# Patient Record
Sex: MALE | Race: WHITE | NOT HISPANIC OR LATINO | URBAN - METROPOLITAN AREA
[De-identification: names, ages, dates, MRNs, and addresses within clinical notes are randomized per-mention and may not be internally consistent; named-entity substitution may affect disease eponyms.]

---

## 2019-10-09 ENCOUNTER — EMERGENCY (EMERGENCY)
Facility: HOSPITAL | Age: 49
LOS: 1 days | Discharge: HOME | End: 2019-10-09
Attending: EMERGENCY MEDICINE | Admitting: EMERGENCY MEDICINE
Payer: COMMERCIAL

## 2019-10-09 VITALS
WEIGHT: 210.1 LBS | DIASTOLIC BLOOD PRESSURE: 82 MMHG | OXYGEN SATURATION: 97 % | SYSTOLIC BLOOD PRESSURE: 126 MMHG | HEIGHT: 72 IN | HEART RATE: 61 BPM | RESPIRATION RATE: 16 BRPM | TEMPERATURE: 98 F

## 2019-10-09 VITALS
HEART RATE: 62 BPM | DIASTOLIC BLOOD PRESSURE: 67 MMHG | RESPIRATION RATE: 16 BRPM | TEMPERATURE: 98 F | SYSTOLIC BLOOD PRESSURE: 105 MMHG | OXYGEN SATURATION: 96 %

## 2019-10-09 DIAGNOSIS — M25.561 PAIN IN RIGHT KNEE: Chronic | ICD-10-CM

## 2019-10-09 LAB
ALBUMIN SERPL ELPH-MCNC: 4.1 G/DL — SIGNIFICANT CHANGE UP (ref 3.3–5)
ALP SERPL-CCNC: 68 U/L — SIGNIFICANT CHANGE UP (ref 40–120)
ALT FLD-CCNC: 28 U/L — SIGNIFICANT CHANGE UP (ref 12–78)
ANION GAP SERPL CALC-SCNC: 6 MMOL/L — SIGNIFICANT CHANGE UP (ref 5–17)
APTT BLD: 33.2 SEC — SIGNIFICANT CHANGE UP (ref 28.5–37)
AST SERPL-CCNC: 24 U/L — SIGNIFICANT CHANGE UP (ref 15–37)
BASOPHILS # BLD AUTO: 0.04 K/UL — SIGNIFICANT CHANGE UP (ref 0–0.2)
BASOPHILS NFR BLD AUTO: 0.5 % — SIGNIFICANT CHANGE UP (ref 0–2)
BILIRUB SERPL-MCNC: 1.2 MG/DL — SIGNIFICANT CHANGE UP (ref 0.2–1.2)
BUN SERPL-MCNC: 14 MG/DL — SIGNIFICANT CHANGE UP (ref 7–23)
CALCIUM SERPL-MCNC: 9 MG/DL — SIGNIFICANT CHANGE UP (ref 8.5–10.1)
CHLORIDE SERPL-SCNC: 107 MMOL/L — SIGNIFICANT CHANGE UP (ref 96–108)
CO2 SERPL-SCNC: 27 MMOL/L — SIGNIFICANT CHANGE UP (ref 22–31)
CREAT SERPL-MCNC: 0.92 MG/DL — SIGNIFICANT CHANGE UP (ref 0.5–1.3)
EOSINOPHIL # BLD AUTO: 0.07 K/UL — SIGNIFICANT CHANGE UP (ref 0–0.5)
EOSINOPHIL NFR BLD AUTO: 0.9 % — SIGNIFICANT CHANGE UP (ref 0–6)
GLUCOSE SERPL-MCNC: 110 MG/DL — HIGH (ref 70–99)
HCT VFR BLD CALC: 43.4 % — SIGNIFICANT CHANGE UP (ref 39–50)
HGB BLD-MCNC: 15.5 G/DL — SIGNIFICANT CHANGE UP (ref 13–17)
IMM GRANULOCYTES NFR BLD AUTO: 0.2 % — SIGNIFICANT CHANGE UP (ref 0–1.5)
INR BLD: 1.1 RATIO — SIGNIFICANT CHANGE UP (ref 0.88–1.16)
LYMPHOCYTES # BLD AUTO: 1.45 K/UL — SIGNIFICANT CHANGE UP (ref 1–3.3)
LYMPHOCYTES # BLD AUTO: 17.7 % — SIGNIFICANT CHANGE UP (ref 13–44)
MCHC RBC-ENTMCNC: 30.5 PG — SIGNIFICANT CHANGE UP (ref 27–34)
MCHC RBC-ENTMCNC: 35.7 GM/DL — SIGNIFICANT CHANGE UP (ref 32–36)
MCV RBC AUTO: 85.3 FL — SIGNIFICANT CHANGE UP (ref 80–100)
MONOCYTES # BLD AUTO: 0.55 K/UL — SIGNIFICANT CHANGE UP (ref 0–0.9)
MONOCYTES NFR BLD AUTO: 6.7 % — SIGNIFICANT CHANGE UP (ref 2–14)
NEUTROPHILS # BLD AUTO: 6.05 K/UL — SIGNIFICANT CHANGE UP (ref 1.8–7.4)
NEUTROPHILS NFR BLD AUTO: 74 % — SIGNIFICANT CHANGE UP (ref 43–77)
NRBC # BLD: 0 /100 WBCS — SIGNIFICANT CHANGE UP (ref 0–0)
PLATELET # BLD AUTO: 189 K/UL — SIGNIFICANT CHANGE UP (ref 150–400)
POTASSIUM SERPL-MCNC: 3.7 MMOL/L — SIGNIFICANT CHANGE UP (ref 3.5–5.3)
POTASSIUM SERPL-SCNC: 3.7 MMOL/L — SIGNIFICANT CHANGE UP (ref 3.5–5.3)
PROT SERPL-MCNC: 7.3 G/DL — SIGNIFICANT CHANGE UP (ref 6–8.3)
PROTHROM AB SERPL-ACNC: 12.5 SEC — SIGNIFICANT CHANGE UP (ref 10–12.9)
RBC # BLD: 5.09 M/UL — SIGNIFICANT CHANGE UP (ref 4.2–5.8)
RBC # FLD: 11.9 % — SIGNIFICANT CHANGE UP (ref 10.3–14.5)
SODIUM SERPL-SCNC: 140 MMOL/L — SIGNIFICANT CHANGE UP (ref 135–145)
WBC # BLD: 8.18 K/UL — SIGNIFICANT CHANGE UP (ref 3.8–10.5)
WBC # FLD AUTO: 8.18 K/UL — SIGNIFICANT CHANGE UP (ref 3.8–10.5)

## 2019-10-09 PROCEDURE — 85027 COMPLETE CBC AUTOMATED: CPT

## 2019-10-09 PROCEDURE — 80053 COMPREHEN METABOLIC PANEL: CPT

## 2019-10-09 PROCEDURE — 85730 THROMBOPLASTIN TIME PARTIAL: CPT

## 2019-10-09 PROCEDURE — 36415 COLL VENOUS BLD VENIPUNCTURE: CPT

## 2019-10-09 PROCEDURE — 96374 THER/PROPH/DIAG INJ IV PUSH: CPT

## 2019-10-09 PROCEDURE — 85610 PROTHROMBIN TIME: CPT

## 2019-10-09 PROCEDURE — 99284 EMERGENCY DEPT VISIT MOD MDM: CPT | Mod: 25

## 2019-10-09 PROCEDURE — 99284 EMERGENCY DEPT VISIT MOD MDM: CPT

## 2019-10-09 RX ORDER — LIDOCAINE 4 G/100G
1 CREAM TOPICAL ONCE
Refills: 0 | Status: COMPLETED | OUTPATIENT
Start: 2019-10-09 | End: 2019-10-09

## 2019-10-09 RX ORDER — HYDROMORPHONE HYDROCHLORIDE 2 MG/ML
1 INJECTION INTRAMUSCULAR; INTRAVENOUS; SUBCUTANEOUS ONCE
Refills: 0 | Status: DISCONTINUED | OUTPATIENT
Start: 2019-10-09 | End: 2019-10-09

## 2019-10-09 RX ORDER — HYDROCORTISONE/PRAMOXINE 2.5 %-1 %
1 CREAM WITH APPLICATOR RECTAL
Qty: 1 | Refills: 0
Start: 2019-10-09 | End: 2019-10-13

## 2019-10-09 RX ORDER — LIDOCAINE 4 G/100G
20 CREAM TOPICAL ONCE
Refills: 0 | Status: DISCONTINUED | OUTPATIENT
Start: 2019-10-09 | End: 2019-10-09

## 2019-10-09 RX ADMIN — LIDOCAINE 1 APPLICATION(S): 4 CREAM TOPICAL at 19:15

## 2019-10-09 RX ADMIN — HYDROMORPHONE HYDROCHLORIDE 1 MILLIGRAM(S): 2 INJECTION INTRAMUSCULAR; INTRAVENOUS; SUBCUTANEOUS at 19:14

## 2019-10-09 NOTE — CONSULT NOTE ADULT - ASSESSMENT
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

## 2019-10-09 NOTE — ED ADULT NURSE NOTE - OBJECTIVE STATEMENT
pt to er states he had some bleeding from the rectum today upon arrival is alert oriented speech clear resp even unlabored no active bleeding at present iv started labs drawn

## 2019-10-09 NOTE — ED PROVIDER NOTE - CLINICAL SUMMARY MEDICAL DECISION MAKING FREE TEXT BOX
48 y/o M, PMH superficial hemorrhoids, here for "spotting" blood from rectum after straining to use thew bathroom last night. Abdomen soft, nontender, nondistended. Currently no signs of symptomatic anemia, will check labs for h&h, vital signs stable.

## 2019-10-09 NOTE — ED PROVIDER NOTE - ATTENDING CONTRIBUTION TO CARE
50 yo male hx of bph, hemorrhoids s/p bowel movement last night, noticed bright red blood per rectum s/p enema use.  No dizziness, no shortness of breath.  +rectal pain    Gen: Alert, NAD  Head/eyes: NC/AT, PERRL, EOMI  ENT: airway patent  Neck: supple, no tenderness/meningismus/JVD, Trachea midline  Pulm/lung: Bilateral clear BS, normal resp effort, no wheeze/stridor/retractions  CV/heart: RRR, no M/R/G, +2 dist pulses (radial, pedal DP/PT, popliteal)  GI/Abd: soft, NT/ND, +BS, no guarding/rebound tenderness, rectal: +multiple external/internal hemorrhoids, prolapsed with mild active bleeding  Musculoskeletal: no edema/erythema/cyanosis, FROM in all extremities, no C/T/L spine ttp  Skin: no rash, no vesicles, no petechaie, no ecchymosis, no swelling  Neuro: AAOx3, CN 2-12 intact, normal sensation, 5/5 motor strength in all extremities, normal gait, no dysmetria    colorectal consulted, reduced by colorectal, labs wnl, IV analgesia

## 2019-10-09 NOTE — ED PROVIDER NOTE - OBJECTIVE STATEMENT
50 y/o M, PMH BPH, superficial hemorrhoids (last colonoscopy 3/2018), here for BRBPR since last night. Pt reports he felt constipated last night, felt his urine stream was dribbling due to constipation so he took about 1/2 an enema, noticed some blood "spotting" from rectum, was able to urinate overnight, then reports that every time he went to urinate he was "spotting" BRBPR. No abdominal pain, n/v, dizziness, lightheadedness, SOB, fevers, chills. Pt reports he had a complete bowel movement today at 1430 with blood present.

## 2019-10-09 NOTE — ED PROVIDER NOTE - PATIENT PORTAL LINK FT
You can access the FollowMyHealth Patient Portal offered by [facility] by registering at the following website: [email or web address] By joining [facility]’s FollowMyHealth portal, you will also be able to view your health information using other applications (apps) compatible with our system.

## 2019-10-09 NOTE — CONSULT NOTE ADULT - SUBJECTIVE AND OBJECTIVE BOX
48 y/o M, PMH BPH, superficial hemorrhoids (last colonoscopy 3/2018), here for BRBPR since last night. Yesterday had trouble with urination but has hx of BPH.  In addition due to holidays ate a lot of meat, got constipation, strained but did use an enema and felt swelling protrusion pain and bleeding.  Came to ER today and called for consult.  In past no issues with hemorrhoids other described.  Hgb stable.  No other hx other than knee pain.  Prev cscope from chart 2018.      PE:  Gen:  Uncomfortable  Abd:  soft, NT/ND  Vis:  Edematous circumferential EH with spontaneous rupture of posterior anal canal/internal thrombosis.  Mucosal distal protrusion IH noted noted  CONCEPCION:  discomfort mult thrombi noted on exam cinrcum  Anoscopy:  able to reduce complete IH and engorgement/mult thrombi noted, no ischemia/gangrenous tissue noted at all    With sugar/lidocaine jellyl reduced with only min EH noted.

## 2019-10-09 NOTE — ED PROVIDER NOTE - CHPI ED SYMPTOMS NEG
no abdominal distension/no nausea/no diarrhea/no dysuria/no hematuria/no chills/no fever/no burning urination/no vomiting

## 2019-10-09 NOTE — ED PROVIDER NOTE - CARE PROVIDER_API CALL
Georgette Singh)  ColonRectal Surgery; Surgery  [address], [address]  [place] [address]  Phone: [phone]  Fax: [phone]  Follow Up Time:

## 2023-02-04 ENCOUNTER — NON-APPOINTMENT (OUTPATIENT)
Age: 53
End: 2023-02-04

## 2024-07-22 PROBLEM — Z00.00 ENCOUNTER FOR PREVENTIVE HEALTH EXAMINATION: Status: ACTIVE | Noted: 2024-07-22

## 2024-09-10 PROBLEM — N40.0 BENIGN PROSTATIC HYPERPLASIA WITHOUT LOWER URINARY TRACT SYMPTOMS: Chronic | Status: ACTIVE | Noted: 2019-10-09

## 2024-09-10 PROBLEM — K64.9 UNSPECIFIED HEMORRHOIDS: Chronic | Status: ACTIVE | Noted: 2019-10-09

## 2024-09-11 ENCOUNTER — APPOINTMENT (OUTPATIENT)
Dept: ORTHOPEDIC SURGERY | Facility: CLINIC | Age: 54
End: 2024-09-11
Payer: COMMERCIAL

## 2024-09-11 DIAGNOSIS — M25.561 PAIN IN RIGHT KNEE: ICD-10-CM

## 2024-09-11 PROCEDURE — 73564 X-RAY EXAM KNEE 4 OR MORE: CPT | Mod: RT

## 2024-09-11 PROCEDURE — 99204 OFFICE O/P NEW MOD 45 MIN: CPT

## 2024-09-12 RX ORDER — DICLOFENAC POTASSIUM 50 MG/1
50 TABLET, COATED ORAL 3 TIMES DAILY
Qty: 60 | Refills: 0 | Status: ACTIVE | COMMUNITY
Start: 2024-09-12 | End: 1900-01-01

## 2024-09-12 NOTE — DISCUSSION/SUMMARY
[de-identified] : 53yM pw R knee post meniscectomy OA from procedure 30y ago now with locking/loose bodies. Getting  in 17d. I discussed that loose body removal would not allow him enough time for full recovery prior to his wedding should he still have an effusion/quad weakness.  Also understands this will not address underlying OA and he will likely need TKA in future, only will address the locking / buckling sensation he has of loose bodies moving into/out of joint from notch. The patient was extensively counseled on treatment options including but not limited to observation, rest/activity modification, bracing, anti-inflammatory medications, physical therapy, injections, and surgery.  The natural history of the disease was thoroughly explained.  We discussed that the majority of the time, this condition can be initially treated conservatively. The patient will proceed with: -HEP, brace -diclofenac rx provided - pt instructed to take with meals and not with other nsaid's including advil, aleve, and toradol.  The pt understands to stop taking the medication if any stomach sx develop or they develop any type of bleeding or bruising, at which point they will present to their PMD -pt was instructed on the importance of resting, icing and elevating to minimize swelling -RTC 6w   I have personally obtained the history, reviewed the ROS as noted, and performed the physical examination today.  The patient and I discussed the assessment and options and developed the plan.  All questions were answered and the patient stated their understanding of the treatment plan and appreciation of the visit.   My cumulative time spent on this patient's visit included: Preparation for the visit, review of the medical records, review of pertinent diagnostic studies, examination and counseling of the patient on the above diagnosis, treatment plan and prognosis, orders of diagnostic tests, medications and/or appropriate procedures and documentation in the medical records of today's visit.   Tuan Deluca MD

## 2024-09-12 NOTE — PHYSICAL EXAM
[de-identified] : Gen: NAD Resp: Nonlabored respirations, no SOB Gait: Nl   R Knee: Skin intact small effusion 5-110 AROM Tender MJL 5/5 IP Q EHL TA GS SILT L3-S1 2+ dp pt wwp bcr   1A lachman and (-) pivot shift Grade 1 posterior drawer Stable to v/v at 0 and 30 degrees (-) Dial test at 30, 90 degrees   (+) Koby, [place] Medial joint pain with shallow 2 leg squat   1+ patellar translation (-) pain with patellar compression/grind (-) J sign (-) apprehension  [de-identified] : The following radiographs were ordered and read by me during this patient's visit. I reviewed each radiograph in detail with the patient and discussed the findings as highlighted below.   4 views of the R knee were obtained today that show no fracture, or dislocation. There are tricompartmental degenerative changes seen with anterior loose bodies. There is no malalignment. No obvious osseous abnormality. Otherwise unremarkable.

## 2024-09-12 NOTE — HISTORY OF PRESENT ILLNESS
[de-identified] : 53yM pw R knee pain.  Hx of meniscectomy mini-open in 1995 - resultant known OA and pain. Has had clicking/locking of knee with sensation of something getting stuck in his knee recently.  Getting  in 3 weeks and wants to be able to function at better level. May go on trip with both sets of kids after. No n/p, no recent trauma.

## 2024-09-18 ENCOUNTER — APPOINTMENT (OUTPATIENT)
Dept: ORTHOPEDIC SURGERY | Facility: CLINIC | Age: 54
End: 2024-09-18
Payer: COMMERCIAL

## 2024-09-18 DIAGNOSIS — M17.10 UNILATERAL PRIMARY OSTEOARTHRITIS, UNSPECIFIED KNEE: ICD-10-CM

## 2024-09-18 PROCEDURE — 99213 OFFICE O/P EST LOW 20 MIN: CPT | Mod: 25

## 2024-09-18 PROCEDURE — 20610 DRAIN/INJ JOINT/BURSA W/O US: CPT | Mod: RT

## 2024-09-18 RX ORDER — DICLOFENAC SODIUM 20 MG/G
2 SOLUTION TOPICAL 3 TIMES DAILY
Qty: 1 | Refills: 0 | Status: ACTIVE | COMMUNITY
Start: 2024-09-18 | End: 1900-01-01

## 2024-09-18 NOTE — HISTORY OF PRESENT ILLNESS
[de-identified] : 53yM pw R knee pain.  Hx of meniscectomy mini-open in 1995 - resultant known OA and pain. Has had clicking/locking of knee with sensation of something getting stuck in his knee recently.  Getting  in 3 weeks and wants to be able to function at better level. May go on trip with both sets of kids after. No n/p, no recent trauma.  9/18 - notes swelling has improved somewhat from advil but feeling some GI effects, will discuss w his PMD Still with some clicking, hoping to be able to having his wedding in 10d w as little knee pain as possible

## 2024-09-18 NOTE — DISCUSSION/SUMMARY
[de-identified] : 53yM pw R knee post meniscectomy OA from procedure 30y ago now with locking/loose bodies. Getting  in 10d. I discussed that loose body removal would not allow him enough time for full recovery prior to his wedding should he still have an effusion/quad weakness.  Also understands this will not address underlying OA and he will likely need TKA in future, only will address the locking / buckling sensation he has of loose bodies moving into/out of joint from notch.  The patient was extensively counseled on treatment options including but not limited to observation, rest/activity modification, bracing, anti-inflammatory medications, physical therapy, injections, and surgery.  The natural history of the disease was thoroughly explained.  We discussed that the majority of the time, this condition can be initially treated conservatively.  The risks, benefits, and alternatives to an injection were reviewed with the patient.  Risks outlined include but are not limited to infection, sepsis, bleeding, scarring, temporary increase in pain, syncope, failure to resolve symptoms, symptom recurrence, allergic reaction and a flare.  The patient understood these risks and wished to proceed with an injection, providing verbal consent. Under sterile conditions using chlorhexidine and an ethyl chloride spray for topic analgesia, an injection of 4cc 1% lidocaine without epinephrine and 1cc 40mg/ml depomedrol was placed in the R knee. The patient tolerated the procedure well without complication.  The patient was advised to call if redness, pain or fever occur and to apply ice for 15 minutes every hour for the next day as tolerated.   The patient will proceed with: -HEP, brace -pt was instructed on the importance of resting, icing and elevating to minimize swelling -RTC 6w   I have personally obtained the history, reviewed the ROS as noted, and performed the physical examination today.  The patient and I discussed the assessment and options and developed the plan.  All questions were answered and the patient stated their understanding of the treatment plan and appreciation of the visit.   My cumulative time spent on this patient's visit included: Preparation for the visit, review of the medical records, review of pertinent diagnostic studies, examination and counseling of the patient on the above diagnosis, treatment plan and prognosis, orders of diagnostic tests, medications and/or appropriate procedures and documentation in the medical records of today's visit.   Tuan Deluca MD

## 2024-09-18 NOTE — PHYSICAL EXAM
[de-identified] : Gen: NAD Resp: Nonlabored respirations, no SOB Gait: Nl   R Knee: Skin intact small effusion 5-110 AROM Tender MJL 5/5 IP Q EHL TA GS SILT L3-S1 2+ dp pt wwp bcr   1A lachman and (-) pivot shift Grade 1 posterior drawer Stable to v/v at 0 and 30 degrees (-) Dial test at 30, 90 degrees   (+) Koby, [place] Medial joint pain with shallow 2 leg squat   1+ patellar translation (-) pain with patellar compression/grind (-) J sign (-) apprehension  [de-identified] : The following radiographs were ordered and read by me during this patient's visit. I reviewed each radiograph in detail with the patient and discussed the findings as highlighted below.   4 views of the R knee were obtained today that show no fracture, or dislocation. There are tricompartmental degenerative changes seen with anterior loose bodies. There is no malalignment. No obvious osseous abnormality. Otherwise unremarkable.

## 2024-10-16 ENCOUNTER — OUTPATIENT (OUTPATIENT)
Dept: OUTPATIENT SERVICES | Facility: HOSPITAL | Age: 54
LOS: 1 days | End: 2024-10-16
Payer: COMMERCIAL

## 2024-10-16 ENCOUNTER — APPOINTMENT (OUTPATIENT)
Dept: ORTHOPEDIC SURGERY | Facility: CLINIC | Age: 54
End: 2024-10-16
Payer: COMMERCIAL

## 2024-10-16 ENCOUNTER — APPOINTMENT (OUTPATIENT)
Dept: MRI IMAGING | Facility: CLINIC | Age: 54
End: 2024-10-16

## 2024-10-16 DIAGNOSIS — Z00.8 ENCOUNTER FOR OTHER GENERAL EXAMINATION: ICD-10-CM

## 2024-10-16 DIAGNOSIS — M25.561 PAIN IN RIGHT KNEE: Chronic | ICD-10-CM

## 2024-10-16 PROCEDURE — 73721 MRI JNT OF LWR EXTRE W/O DYE: CPT

## 2024-10-16 PROCEDURE — 73721 MRI JNT OF LWR EXTRE W/O DYE: CPT | Mod: 26,RT

## 2024-10-16 PROCEDURE — 99215 OFFICE O/P EST HI 40 MIN: CPT

## 2024-10-16 RX ORDER — CHLORHEXIDINE GLUCONATE 4 G/100ML
4 SOLUTION TOPICAL
Qty: 1 | Refills: 0 | Status: ACTIVE | COMMUNITY
Start: 2024-10-16 | End: 1900-01-01

## 2024-10-23 ENCOUNTER — APPOINTMENT (OUTPATIENT)
Dept: ORTHOPEDIC SURGERY | Facility: CLINIC | Age: 54
End: 2024-10-23
Payer: COMMERCIAL

## 2024-10-23 PROCEDURE — 99213 OFFICE O/P EST LOW 20 MIN: CPT

## 2024-10-24 ENCOUNTER — APPOINTMENT (OUTPATIENT)
Age: 54
End: 2024-10-24

## 2024-10-24 DIAGNOSIS — Z83.3 FAMILY HISTORY OF DIABETES MELLITUS: ICD-10-CM

## 2024-10-24 DIAGNOSIS — Z80.49 FAMILY HISTORY OF MALIGNANT NEOPLASM OF OTHER GENITAL ORGANS: ICD-10-CM

## 2024-10-24 DIAGNOSIS — M23.40 LOOSE BODY IN KNEE, UNSPECIFIED KNEE: ICD-10-CM

## 2024-10-24 DIAGNOSIS — M17.10 UNILATERAL PRIMARY OSTEOARTHRITIS, UNSPECIFIED KNEE: ICD-10-CM

## 2024-10-24 DIAGNOSIS — Z78.9 OTHER SPECIFIED HEALTH STATUS: ICD-10-CM

## 2024-10-24 NOTE — PRE PROCEDURE NOTE - PRE PROCEDURE EVALUATION
Reason For Visit       ROBIN IBARRA is a 54 year old male being seen for a procedure visit, right knee arthroscopy; tele video.  Operative Date: 11/1/24  ?  History of Present Illness         55 y/o male who had undergone right knee meniscectomy mini open 1995 c/o right knee pain, swelling, clicking and buckling for the past several years. pain has progressively gotten worse over the past 2 months. Reports locking of knee with sensation of something getting stuck in his right knee. MRI was done which showed effusion ,meniscus tear and loose bodies. scheduled for right knee arthroscopy, loose body removal on 11/1/24.  ?  Active Problems  Arthritis of knee (716.96) (M17.10)  Body, loose, knee (717.6) (M23.40)           ?  Past Medical History  History of Right knee pain (719.46) (M25.561)           ?  Current Meds  Diclofenac Potassium 50 MG Oral Tablet; TAKE 1 TABLET 2 TO 3 TIMES DAILY AFTER  MEALS  Diclofenac Sodium 2 % External Solution; APPLY 1 INCH 3 times daily  Isabella-Hex 4 4 % External Solution; RUB OVER THE AFFECTED AREA AND LEAVE IT FOR  2-3 MIN AND RINSE IT ON THE DAY OF SURGERY           ?  Allergies  No Known Allergies           ?  Review of Systems          Constitutional: no fever, no chills, not feeling poorly, not feeling tired and no recent weight gain.    Eyes: no eye pain, eyes not red, no eyesight problems, no purulent discharge from the eyes, no dryness of the eyes and no itching of the eyes.    ENT: no earache, no hearing loss, no nosebleeds, no nasal discharge, no sore throat and no hoarseness.    Cardiovascular: the heart rate was not slow, the heart rate was not fast, no chest pain, no palpitations and no intermittent leg claudication.    Respiratory: no shortness of breath, no wheezing, no cough, no shortness of breath during exertion and no orthopnea.    Gastrointestinal: no abdominal pain, no vomiting, no constipation, no diarrhea and no heartburn . hemorrhoids.    Genitourinary: no dysuria, no incontinence and no nocturia.    Musculoskeletal: joint swelling and joint stiffness, but no limb pain . Right knee pain, swelling.    Integumentary: no skin lesions, no skin wound, no itching, no change in a mole, no dry skin and no unusual growth on the skin . Right knee; change in skin color after using voltaren cream , Dr schilling aware.    Neurological: no confusion, no convulsions, no dizziness, no fainting, no limb weakness and no difficulty walking.    Psychiatric: not suicidal, no sleep disturbances, no anxiety and no depression.    Endocrine: no proptosis, no hot flashes, no muscle weakness, no deepening of the voice and no feelings of weakness.    Hematologic/Lymphatic: no tendency for easy bleeding, no tendency for easy bruising, no swollen glands and no swollen glands in the neck.  ?  Assessment  History of Knee arthroscopy  Family history of diabetes mellitus (V18.0) (Z83.3) : Mother  Family history of malignant neoplasm of uterus (V16.49) (Z80.49) : Mother  Social alcohol use (V49.89) (Z78.9)  History of Knee Surgery  Arthritis of knee (716.96) (M17.10)  Body, loose, knee (717.6) (M23.40)    Assessment; 55 y/o male with right knee meniscus tear and loose bodies  ?  Plan ; scheduled for right knee arthroscopy on 11/1/24  will obtain medical clearance Dr Goncalves  , labs and EKG done by PCP  pre op instructions on wash and medications.            ?       Electronically signed by : LEONEL SALVADOR NP; Oct 24 2024  3:14PM Eastern Standard Time (Author) Reason For Visit       ROBIN IBARRA is a 54 year old male being seen for a procedure visit, right knee arthroscopy; tele video.  Operative Date: 11/1/24  ?  History of Present Illness         55 y/o male who had undergone right knee meniscectomy mini open 1995 c/o right knee pain, swelling, clicking and buckling for the past several years. pain has progressively gotten worse over the past 2 months. Reports locking of knee with sensation of something getting stuck in his right knee. MRI was done which showed effusion ,meniscus tear and loose bodies. scheduled for right knee arthroscopy, loose body removal on 11/1/24.  ?  Active Problems  Arthritis of knee (716.96) (M17.10)  Body, loose, knee (717.6) (M23.40)           ?  Past Medical History  History of Right knee pain (719.46) (M25.561)           ?  Current Meds  Diclofenac Potassium 50 MG Oral Tablet; TAKE 1 TABLET 2 TO 3 TIMES DAILY AFTER  MEALS  Diclofenac Sodium 2 % External Solution; APPLY 1 INCH 3 times daily  Isabella-Hex 4 4 % External Solution; RUB OVER THE AFFECTED AREA AND LEAVE IT FOR  2-3 MIN AND RINSE IT ON THE DAY OF SURGERY           ?  Allergies  No Known Allergies           ?  Review of Systems          Constitutional: no fever, no chills, not feeling poorly, not feeling tired and no recent weight gain.    Eyes: no eye pain, eyes not red, no eyesight problems, no purulent discharge from the eyes, no dryness of the eyes and no itching of the eyes.    ENT: no earache, no hearing loss, no nosebleeds, no nasal discharge, no sore throat and no hoarseness.    Cardiovascular: the heart rate was not slow, the heart rate was not fast, no chest pain, no palpitations and no intermittent leg claudication.    Respiratory: no shortness of breath, no wheezing, no cough, no shortness of breath during exertion and no orthopnea.    Gastrointestinal: no abdominal pain, no vomiting, no constipation, no diarrhea and no heartburn . hemorrhoids.    Genitourinary: no dysuria, no incontinence and no nocturia.    Musculoskeletal: joint swelling and joint stiffness, but no limb pain . Right knee pain, swelling.    Integumentary: no skin lesions, no skin wound, no itching, no change in a mole, no dry skin and no unusual growth on the skin . Right knee; change in skin color after using voltaren cream , Dr schilling aware.    Neurological: no confusion, no convulsions, no dizziness, no fainting, no limb weakness and no difficulty walking.    Psychiatric: not suicidal, no sleep disturbances, no anxiety and no depression.    Endocrine: no proptosis, no hot flashes, no muscle weakness, no deepening of the voice and no feelings of weakness.    Hematologic/Lymphatic: no tendency for easy bleeding, no tendency for easy bruising, no swollen glands and no swollen glands in the neck.    Physical Exam       - Constitutional: healthy, well groomed , no distress     Eyes; PERRL,conjunctiva clear     ENMT; Throat clear , no redness   - Respiratory: clear to auscultation bilaterally ,no wheezes,no rales, no rhonchi   - Cardiovascular: Regular rate and rhythm, S1S2 present      Abdomen : soft , nontender , non distended ,BS present      Musculoskeletal- No joint erythema, no calf tenderness. Right  knee tenderness on posterior aspect  , decreased ROM due to pain . white patches noted on medial aspect s/p  using Voltaren cream   - Neuro: sensation intact, responds to pain , responds to verbal commands     Psych: Patient is alert and oriented to person, place and time. Patient has a normal mood and affect.    Skin: No rashes, lesions, or other abnormalities are noted in the upper extremities.  exam ?  Assessment  History of Knee arthroscopy  Family history of diabetes mellitus (V18.0) (Z83.3) : Mother  Family history of malignant neoplasm of uterus (V16.49) (Z80.49) : Mother  Social alcohol use (V49.89) (Z78.9)  History of Knee Surgery  Arthritis of knee (716.96) (M17.10)  Body, loose, knee (717.6) (M23.40)    Assessment; 55 y/o male with right knee meniscus tear and loose bodies  ?  Plan ; scheduled for right knee arthroscopy on 11/1/24  will obtain medical clearance Dr Goncalves  , labs and EKG done by PCP  pre op instructions on wash and medications.            ?       Electronically signed by : LEONEL SALVADOR NP; Oct 24 2024  3:14PM Eastern Standard Time (Author)

## 2024-10-28 ENCOUNTER — NON-APPOINTMENT (OUTPATIENT)
Age: 54
End: 2024-10-28

## 2024-11-01 ENCOUNTER — TRANSCRIPTION ENCOUNTER (OUTPATIENT)
Age: 54
End: 2024-11-01

## 2024-11-01 ENCOUNTER — OUTPATIENT (OUTPATIENT)
Dept: OUTPATIENT SERVICES | Facility: HOSPITAL | Age: 54
LOS: 1 days | End: 2024-11-01
Payer: COMMERCIAL

## 2024-11-01 ENCOUNTER — APPOINTMENT (OUTPATIENT)
Dept: ORTHOPEDIC SURGERY | Facility: HOSPITAL | Age: 54
End: 2024-11-01

## 2024-11-01 VITALS
OXYGEN SATURATION: 97 % | SYSTOLIC BLOOD PRESSURE: 111 MMHG | DIASTOLIC BLOOD PRESSURE: 37 MMHG | HEART RATE: 66 BPM | RESPIRATION RATE: 15 BRPM | TEMPERATURE: 98 F

## 2024-11-01 VITALS
TEMPERATURE: 98 F | WEIGHT: 207.9 LBS | HEIGHT: 72 IN | DIASTOLIC BLOOD PRESSURE: 62 MMHG | RESPIRATION RATE: 20 BRPM | SYSTOLIC BLOOD PRESSURE: 115 MMHG | OXYGEN SATURATION: 100 % | HEART RATE: 62 BPM

## 2024-11-01 DIAGNOSIS — M23.40 LOOSE BODY IN KNEE, UNSPECIFIED KNEE: ICD-10-CM

## 2024-11-01 DIAGNOSIS — M25.561 PAIN IN RIGHT KNEE: Chronic | ICD-10-CM

## 2024-11-01 PROCEDURE — 29880 ARTHRS KNE SRG MNISECTMY M&L: CPT | Mod: RT

## 2024-11-01 PROCEDURE — 29881 ARTHRS KNE SRG MNISECTMY M/L: CPT | Mod: RT

## 2024-11-01 PROCEDURE — 97161 PT EVAL LOW COMPLEX 20 MIN: CPT

## 2024-11-01 DEVICE — ANCHOR SYST OMEGA PEEK KNOTLESS SINGLE 4.75MM: Type: IMPLANTABLE DEVICE | Site: RIGHT | Status: FUNCTIONAL

## 2024-11-01 DEVICE — SYS DVC AIR PLUS MENISCAL CURVED DOWN: Type: IMPLANTABLE DEVICE | Site: RIGHT | Status: FUNCTIONAL

## 2024-11-01 DEVICE — SYS DVC AIR MENISCAL CURVED UP: Type: IMPLANTABLE DEVICE | Site: RIGHT | Status: FUNCTIONAL

## 2024-11-01 RX ORDER — ASPIRIN/MAG CARB/ALUMINUM AMIN 325 MG
1 TABLET ORAL
Qty: 56 | Refills: 0
Start: 2024-11-01 | End: 2024-11-28

## 2024-11-01 RX ORDER — HYDROMORPHONE HCL/0.9% NACL/PF 6 MG/30 ML
0.5 PATIENT CONTROLLED ANALGESIA SYRINGE INTRAVENOUS
Refills: 0 | Status: DISCONTINUED | OUTPATIENT
Start: 2024-11-01 | End: 2024-11-01

## 2024-11-01 RX ORDER — IBUPROFEN 200 MG
1 TABLET ORAL
Qty: 24 | Refills: 0
Start: 2024-11-01 | End: 2024-11-08

## 2024-11-01 RX ORDER — OXYCODONE HYDROCHLORIDE 30 MG/1
1 TABLET ORAL
Qty: 21 | Refills: 0
Start: 2024-11-01

## 2024-11-01 RX ORDER — APREPITANT 40 MG/1
40 CAPSULE ORAL ONCE
Refills: 0 | Status: COMPLETED | OUTPATIENT
Start: 2024-11-01 | End: 2024-11-01

## 2024-11-01 RX ORDER — CEFAZOLIN SODIUM 1 G
2000 VIAL (EA) INJECTION ONCE
Refills: 0 | Status: COMPLETED | OUTPATIENT
Start: 2024-11-01 | End: 2024-11-01

## 2024-11-01 RX ORDER — CHLORHEXIDINE GLUCONATE 40 MG/ML
1 SOLUTION TOPICAL ONCE
Refills: 0 | Status: COMPLETED | OUTPATIENT
Start: 2024-11-01 | End: 2024-11-01

## 2024-11-01 RX ORDER — ONDANSETRON HYDROCHLORIDE 2 MG/ML
4 INJECTION, SOLUTION INTRAMUSCULAR; INTRAVENOUS ONCE
Refills: 0 | Status: DISCONTINUED | OUTPATIENT
Start: 2024-11-01 | End: 2024-11-01

## 2024-11-01 RX ORDER — OXYCODONE HYDROCHLORIDE 30 MG/1
5 TABLET ORAL ONCE
Refills: 0 | Status: DISCONTINUED | OUTPATIENT
Start: 2024-11-01 | End: 2024-11-01

## 2024-11-01 RX ORDER — HYDROMORPHONE HCL/0.9% NACL/PF 6 MG/30 ML
1 PATIENT CONTROLLED ANALGESIA SYRINGE INTRAVENOUS
Refills: 0 | Status: DISCONTINUED | OUTPATIENT
Start: 2024-11-01 | End: 2024-11-01

## 2024-11-01 RX ADMIN — Medication 75 MILLILITER(S): at 14:30

## 2024-11-01 RX ADMIN — Medication 0.5 MILLIGRAM(S): at 10:49

## 2024-11-01 RX ADMIN — APREPITANT 40 MILLIGRAM(S): 40 CAPSULE ORAL at 08:03

## 2024-11-01 RX ADMIN — CHLORHEXIDINE GLUCONATE 1 APPLICATION(S): 40 SOLUTION TOPICAL at 08:03

## 2024-11-01 RX ADMIN — Medication 0.5 MILLIGRAM(S): at 10:34

## 2024-11-01 NOTE — PHYSICAL THERAPY INITIAL EVALUATION ADULT - DID THE PATIENT HAVE SURGERY?
right knee arthroscopy loose body removal possible meniscus debridement possible abrasion chondroplasty/yes

## 2024-11-01 NOTE — ASU DISCHARGE PLAN (ADULT/PEDIATRIC) - FINANCIAL ASSISTANCE
[facility] provides services at a reduced cost to those who are determined to be eligible through [facility]’s financial assistance program. Information regarding [facility]’s financial assistance program can be found by going to [email or web address] or by calling [phone].

## 2024-11-01 NOTE — PHYSICAL THERAPY INITIAL EVALUATION ADULT - PERTINENT HX OF CURRENT PROBLEM, REHAB EVAL
pt is a 55 y/o M s/p right knee arthroscopy loose body removal possible meniscus debridement possible abrasion chondroplasty 11/1/24

## 2024-11-01 NOTE — PHYSICAL THERAPY INITIAL EVALUATION ADULT - ADDITIONAL COMMENTS
Pt resides in pvt home with spouse/dtr, available to assist as needed upon d/c. Pt states 1STE, full flight +HR inside. Pt needs crutches. Pt reports was independent prior to admission.

## 2024-11-01 NOTE — PHYSICAL THERAPY INITIAL EVALUATION ADULT - STANDING BALANCE: DYNAMIC, REHAB EVAL
-Dressing to remain clean, dry, and intact until follow up appointment  -call Physician for any signs of wound infection: Fever greater than 101 degrees Fahrenheit, bleeding, separation of incision, pus like drainage, or excessive swelling   -call Physician for difficulty breathing, vomiting, inability to tolerate oral intake   -call Physician for any pain that is not controlled by pain medication or anything worrisome   -avoid driving while taking pain medications or experiencing pain   -contact physician's office for post-operative appointment  -weightbearing as tolerated in CAM boot  -ice to surgical site      
with crutches/good balance

## 2024-11-01 NOTE — ASU DISCHARGE PLAN (ADULT/PEDIATRIC) - ASU DC SPECIAL INSTRUCTIONSFT
Goal: Plan of Care Review  Outcome: Ongoing, Progressing  Goal: Patient-Specific Goal (Individualized)  Outcome: Ongoing, Progressing  Goal: Absence of Hospital-Acquired Illness or Injury  Outcome: Ongoing, Progressing  Goal: Optimal Comfort and Wellbeing  Outcome: Ongoing, Progressing  Goal: Readiness for Transition of Care  Outcome: Ongoing, Progressing     Problem: Bariatric Environmental Safety  Goal: Safety Maintained with Care  Outcome: Ongoing, Progressing     Problem: Device-Related Complication Risk (Hemodialysis)  Goal: Safe, Effective Therapy Delivery  Outcome: Ongoing, Progressing     Problem: Hemodynamic Instability (Hemodialysis)  Goal: Effective Tissue Perfusion  Outcome: Ongoing, Progressing     Problem: Infection (Hemodialysis)  Goal: Absence of Infection Signs and Symptoms  Outcome: Ongoing, Progressing     Problem: Diabetes Comorbidity  Goal: Blood Glucose Level Within Targeted Range  Outcome: Ongoing, Progressing     Problem: Fluid and Electrolyte Imbalance (Acute Kidney Injury/Impairment)  Goal: Fluid and Electrolyte Balance  Outcome: Ongoing, Progressing     Problem: Renal Function Impairment (Acute Kidney Injury/Impairment)  Goal: Effective Renal Function  Outcome: Ongoing, Progressing     Problem: Infection  Goal: Absence of Infection Signs and Symptoms  Outcome: Ongoing, Progressing     Problem: Impaired Wound Healing  Goal: Optimal Wound Healing  Outcome: Ongoing, Progressing     Problem: Skin Injury Risk Increased  Goal: Skin Health and Integrity  Outcome: Ongoing, Progressing     Problem: Electrolyte Imbalance (Chronic Kidney Disease)  Goal: Electrolyte Balance  Outcome: Ongoing, Progressing     Problem: Hypertension Acute  Goal: Blood Pressure Within Desired Range  Outcome: Ongoing, Progressing     Problem: Fall Injury Risk  Goal: Absence of Fall and Fall-Related Injury  Outcome: Ongoing, Progressing      rest  ice  elevate  wiggle toes  Knee immbolizer  x  48 hours for comfort  keep area clean and  try    Follow up Dr. Deluca office call to confirm appt   Follow up  primary care MD call to confirm appt  Narcotic pain medications as needed  Tylenol  500mg   2 tablets  every  8 hours for pain rest  ice  elevate  wiggle toes  Knee immbolizer  x  48 hours for comfort  keep area clean and  try    Follow up Dr. Deluca office call to confirm appt   Follow up  primary care MD call to confirm appt  Narcotic pain medications as needed  Tylenol  500mg   2 tablets  every  8 hours for pain  weight bearing as tolerated

## 2024-11-01 NOTE — PHYSICAL THERAPY INITIAL EVALUATION ADULT - RANGE OF MOTION EXAMINATION, REHAB EVAL
R LE not tested secondary to surgery/KI/bilateral upper extremity ROM was WFL (within functional limits)/Left LE ROM was WFL (within functional limits)/deficits as listed below

## 2024-11-01 NOTE — PHYSICAL THERAPY INITIAL EVALUATION ADULT - NSACTIVITYREC_GEN_A_PT
Pt educated on R LE WBAT/KI. Pt independent in transfers, and amb with crutches. Pt edu/instructed on stair negotiation simulation with HR/crutches, recommended caregiver assist for safety. Pt issued crutches. Pt verbalized family available to assist as needed upon d/c. Pt questions answered to pt satisfaction, verbalized understanding/agreement to edu/recommendations provided. Pt education handout issued. Pt cleared from PT services, no skilled PT needs upon d/c.

## 2024-11-01 NOTE — ASU DISCHARGE PLAN (ADULT/PEDIATRIC) - DISCHARGE TO
[facility] Family [facility] phone call message- general phone call:    Reason for call: the home care nurse called to give an FYI  That she was un abele to contact the family to do a home visit     Return call needed: No    OK to leave a message on voice mail? Yes    Primary language: English      needed? No    Call taken on February 24, 2017 at 10:15 AM by Adams Quick   THE   
Routed to Dr. Albert.     /CORBIN Whalen    
Home

## 2024-11-01 NOTE — ASU DISCHARGE PLAN (ADULT/PEDIATRIC) - CARE PROVIDER_API CALL
Tuan Deluca.  Orthopaedic Surgery  [address], [address]  [place] [address]  Phone: [phone]  Fax: [phone]  Follow Up Time: 2 weeks

## 2024-11-13 ENCOUNTER — APPOINTMENT (OUTPATIENT)
Dept: ORTHOPEDIC SURGERY | Facility: CLINIC | Age: 54
End: 2024-11-13
Payer: COMMERCIAL

## 2024-11-13 DIAGNOSIS — M23.40 LOOSE BODY IN KNEE, UNSPECIFIED KNEE: ICD-10-CM

## 2024-11-13 PROCEDURE — 99024 POSTOP FOLLOW-UP VISIT: CPT

## 2024-12-07 ENCOUNTER — NON-APPOINTMENT (OUTPATIENT)
Age: 54
End: 2024-12-07

## 2025-01-08 ENCOUNTER — APPOINTMENT (OUTPATIENT)
Dept: ORTHOPEDIC SURGERY | Facility: CLINIC | Age: 55
End: 2025-01-08
Payer: COMMERCIAL

## 2025-01-08 DIAGNOSIS — S86.912A STRAIN OF UNSPECIFIED MUSCLE(S) AND TENDON(S) AT LOWER LEG LEVEL, LEFT LEG, INITIAL ENCOUNTER: ICD-10-CM

## 2025-01-08 DIAGNOSIS — M23.40 LOOSE BODY IN KNEE, UNSPECIFIED KNEE: ICD-10-CM

## 2025-01-08 PROCEDURE — 73564 X-RAY EXAM KNEE 4 OR MORE: CPT | Mod: LT

## 2025-01-08 PROCEDURE — 99213 OFFICE O/P EST LOW 20 MIN: CPT | Mod: 24

## 2025-02-26 ENCOUNTER — APPOINTMENT (OUTPATIENT)
Dept: ORTHOPEDIC SURGERY | Facility: CLINIC | Age: 55
End: 2025-02-26
Payer: COMMERCIAL

## 2025-02-26 DIAGNOSIS — M17.10 UNILATERAL PRIMARY OSTEOARTHRITIS, UNSPECIFIED KNEE: ICD-10-CM

## 2025-02-26 DIAGNOSIS — M23.40 LOOSE BODY IN KNEE, UNSPECIFIED KNEE: ICD-10-CM

## 2025-02-26 PROCEDURE — 99213 OFFICE O/P EST LOW 20 MIN: CPT

## (undated) DEVICE — SOL IRR POUR NS 0.9% 1000ML

## (undated) DEVICE — SPECIMEN CONTAINER 4OZ

## (undated) DEVICE — DRAPE U (CLEAR) 47 X 51"

## (undated) DEVICE — Device

## (undated) DEVICE — PACK KNEE ARTHROSCOPY

## (undated) DEVICE — TUBING SUCTION 20FT

## (undated) DEVICE — DRSG COMBINE 5X9"

## (undated) DEVICE — SHAVER BLADE LINVATEC ULTRAFRR 3.5MM

## (undated) DEVICE — S&N ARTHROCARE WAND COBLATION WEREWOLF FLOW 50

## (undated) DEVICE — DRAPE 3/4 SHEET 52X76"

## (undated) DEVICE — TOURNIQUET CUFF 30" DUAL PORT W PLC

## (undated) DEVICE — LAP PAD W RING 18 X 18"

## (undated) DEVICE — NDL SPINAL 18G X 3.5" (PINK)

## (undated) DEVICE — SYR LUER LOK 10CC

## (undated) DEVICE — TOURNIQUET ESMARK 6"

## (undated) DEVICE — GLV 8 PROTEXIS (BLUE)

## (undated) DEVICE — LAP PAD 18 X 18"

## (undated) DEVICE — CUTTER BONE TOMCAT 4MM

## (undated) DEVICE — KNOT PUSHER SUTURE CUTTER AND SLED AIR DISP

## (undated) DEVICE — TUBING DEPUY MITEK FMS VUE INFLOW

## (undated) DEVICE — SUT MONOSOF 3-0 18" C-14

## (undated) DEVICE — DRSG ACE BANDAGE 6"

## (undated) DEVICE — TOURNIQUET CUFF 34" DUAL PORT W PLC

## (undated) DEVICE — WARMING BLANKET UPPER ADULT

## (undated) DEVICE — VENODYNE/SCD SLEEVE CALF MEDIUM

## (undated) DEVICE — STRYKER SERFAS 50-S SWEEP PROBE 3.5 X 135MM

## (undated) DEVICE — SYM-ARTHROSCOPY SHAVER: Type: DURABLE MEDICAL EQUIPMENT

## (undated) DEVICE — TUBING DEPUY MITEK FMS OUTFLOW

## (undated) DEVICE — GLV 7.5 PROTEXIS (WHITE)